# Patient Record
Sex: FEMALE | Employment: FULL TIME | URBAN - METROPOLITAN AREA
[De-identification: names, ages, dates, MRNs, and addresses within clinical notes are randomized per-mention and may not be internally consistent; named-entity substitution may affect disease eponyms.]

---

## 2018-08-13 ENCOUNTER — PATIENT OUTREACH (OUTPATIENT)
Dept: OTHER | Age: 73
End: 2018-08-13

## 2018-08-13 NOTE — PROGRESS NOTES
Transition Of Care Note    Patient discharged from [address] [place] [address] on 8/10  for Hip/pelvic pain after fall, resulting in 2 pelvic fractures. Medical History:     Past Medical History:   Diagnosis Date    Hepatitis     clinical- reaction to [address]    Ill-defined condition     fracture tibia/ fibula ( unsure which side)    Ill-defined condition     fracture left wrist    Ill-defined condition     fracture L5?  Ill-defined condition     HAYDE syndrome    Ill-defined condition 5/18/15    possible patella fracture versus patella rupture- left       Care Manager contacted the patient by telephone to perform post hospital discharge assessment. Verified  and zip code with patient as identifiers. Provided introduction to self, and explanation of the Nurse Care Manager role. Patient reports falling in the hotel lobby, resulting in a double pelvic fracture. Patient states that she has been informed that surgery will not be required, however will follow up with ortho on 8/15. Condition Focused Assessment:   Patient reports the following: Orthopedic Condition Focused Assessment    Skin- any open wounds or incisions? no  Description and location of wound- NA  Have you recently had any of the following? Any recent changes in activity yes,  Mobility or assistive device in place yes, currently using a walker to assist with ambulation. PT/OT/ST ordered no, saw patient in the hospital  Numbness or tingling no  Weakness no  Trouble with coordinating your movement, or change in gait no  New or worsening pain to calf, back of knee or groin no   Redness, swelling to leg or groin no  Fever no  Nausea no  Vomiting no  Chills or clammy skin no  Change in urine output no  Recent change in urinary or bowel continence no  Change in speech no  Difficulty swallowing no  Dizziness/lightheadedness no  Sleep disturbance no     Visual changes no  Medication changes?  yes    Medication:   New Medications at Discharge: Percocet, Tylenol, Metoprolol  Changed Medications at Discharge: None  Discontinued Medications at Discharge: None  Current Outpatient Prescriptions   Medication Sig    HYDROcodone-acetaminophen (NORCO) 7.5-325 mg per tablet Take 1 Tab by mouth every four (4) hours as needed. Max Daily Amount: 6 Tabs.  cetirizine (ZYRTEC) 10 mg tablet Take 10 mg by mouth daily.  oxyCODONE-acetaminophen (PERCOCET) 5-325 mg per tablet Take 1-2 Tabs by mouth every four (4) hours as needed for Pain. Max Daily Amount: 12 Tabs.  HYDROmorphone (DILAUDID) 2 mg tablet Take 1-2 Tabs by mouth every four (4) hours as needed for Pain. Max Daily Amount: 24 mg.  Cholecalciferol, Vitamin D3, (VITAMIN D3) 1,000 unit cap Take  by mouth daily. No current facility-administered medications for this visit. There are no discontinued medications. Performed medication reconciliation with patient, and patient verbalizes understanding of administration of home medications. There were no barriers to obtaining medications identified at this time. Inpatient RRAT score: Not available   Was this a readmission? no   Patient stated reason for the readmission: NA    Barriers/Support system:  patient and friend      Barriers/Challenges to Care: []  Decline in memory    []  Language barrier     []  Emotional                  [x]  Limited mobility  []  Lack of motivation     [] Vision, hearing or cognitive impairment []  Knowledge [] Financial Barriers []  Lack of support  [x]  Pain []  Other []  None    CM Identified  Problems   (contributing problems for risk for readmission)  1. Pain  2. Decreased mobility        Discharge Instructions :  Reviewed discharge instructions with patient. Patient verbalizes understanding of discharge instructions and follow-up care.      Advance Care Planning:   Patient was offered the opportunity to discuss advance care planning:  no     Does patient have an Advance Directive:  no If no, did you provide information on [facility]?  no     PCP/Specialist follow up: Patient scheduled to follow up with Bari Downing MD on 8/14. Patient will see Dr. Kade Massey (ortho) on 8/15. No future appointments. Reviewed red flags with patient, and patient verbalizes understanding. Patient given an opportunity to ask questions. No other clinical/social/functional needs noted. The patient agrees to contact the PCP office for questions related to their healthcare. The patient expressed thanks, offered no additional questions and ended the call.

## 2018-08-16 ENCOUNTER — HOSPITAL ENCOUNTER (OUTPATIENT)
Dept: MAMMOGRAPHY | Age: 73
Discharge: HOME | End: 2018-08-16
Attending: INTERNAL MEDICINE
Payer: COMMERCIAL

## 2018-08-16 DIAGNOSIS — Z12.31 VISIT FOR SCREENING MAMMOGRAM: ICD-10-CM

## 2018-08-16 DIAGNOSIS — M81.0 OSTEOPOROSIS: ICD-10-CM

## 2018-08-16 PROCEDURE — 77080 DXA BONE DENSITY AXIAL: CPT

## 2018-08-16 PROCEDURE — 77067 SCR MAMMO BI INCL CAD: CPT

## 2018-08-24 ENCOUNTER — PATIENT OUTREACH (OUTPATIENT)
Dept: OTHER | Age: 73
End: 2018-08-24

## 2018-08-24 NOTE — PROGRESS NOTES
Telephonic outreach attempt to follow up with patient following hospitalization from a fall, resulting in 2 fractures. Left a discreet VM, requesting a return call.

## 2018-08-30 ENCOUNTER — PATIENT OUTREACH (OUTPATIENT)
Dept: OTHER | Age: 73
End: 2018-08-30

## 2018-08-30 NOTE — PROGRESS NOTES
Telephonic outreach and return call from patient, currently enrolled in [facility]. Patient verified her  and zip code. Patient followed up with her Primary Care Physician, Dr. Regina Agudelo. Dexa Scan completed and patient prescribed Alendronate Sodium (Fosamax). Reports that her pain has improved, taking Tylenol for pain. Patient is participating in physical therapy and currently using a crutch, no longer needing the walker for ambulation. Patient has an appointment with her Cardiologist next week. This care manager will reach out to patient again in approximately 2 weeks to assess progress.

## 2018-10-04 ENCOUNTER — PATIENT OUTREACH (OUTPATIENT)
Dept: OTHER | Age: 73
End: 2018-10-04

## 2018-10-04 NOTE — PROGRESS NOTES
Resolving current episode [facility] (Transitions of care complete). No further ED/UC or hospital admissions within 30 days post discharge. Patient attended follow-up appointments as directed. No outreach from patient to [address] [place] [address]. Telephonic outreach attempt to patient. Left a discreet VM. Patient returned call, stated feeling well and progressing. Patient encouraged to reach out to this CM in the future should case management needs arise.

## 2019-10-11 ENCOUNTER — HOSPITAL ENCOUNTER (OUTPATIENT)
Dept: MAMMOGRAPHY | Age: 74
Discharge: HOME | End: 2019-10-11
Payer: COMMERCIAL

## 2019-10-11 DIAGNOSIS — Z12.31 VISIT FOR SCREENING MAMMOGRAM: ICD-10-CM

## 2019-10-11 PROCEDURE — 77067 SCR MAMMO BI INCL CAD: CPT

## 2020-11-05 ENCOUNTER — TRANSCRIBE ORDER (OUTPATIENT)
Dept: SCHEDULING | Age: 75
End: 2020-11-05

## 2020-11-05 DIAGNOSIS — Z12.31 VISIT FOR SCREENING MAMMOGRAM: Primary | ICD-10-CM

## 2021-07-17 ENCOUNTER — NURSE TRIAGE (OUTPATIENT)
Dept: OTHER | Facility: CLINIC | Age: 76
End: 2021-07-17

## 2021-07-17 NOTE — TELEPHONE ENCOUNTER
Brief description of triage: patient calls to state that she is having more frequent urination and burning. States that it started Thursday    Triage indicates for patient to see PCP within 24 hours, if not open or no available appointments, patient encouraged to go to the nearest urgent care center    Care advice provided, patient verbalizes understanding; denies any other questions or concerns; instructed to call back for any new or worsening symptoms. Attention Provider: Thank you for allowing me to participate in the care of your patient. The patient was connected to triage in response to symptoms provided. Please do not respond through this encounter as the response is not directed to a shared pool. Reason for Disposition   Age > 50 years    Answer Assessment - Initial Assessment Questions  1. SEVERITY: \"How bad is the pain? \"  (e.g., Scale 1-10; mild, moderate, or severe)    - MILD (1-3): complains slightly about urination hurting    - MODERATE (4-7): interferes with normal activities      - SEVERE (8-10): excruciating, unwilling or unable to urinate because of the pain       3    2. FREQUENCY: \"How many times have you had painful urination today? \"       3    3. PATTERN: \"Is pain present every time you urinate or just sometimes? \"       Every time    4. ONSET: \"When did the painful urination start? \"       Thursday    5. FEVER: \"Do you have a fever? \" If so, ask: \"What is your temperature, how was it measured, and when did it start? \"      No    6. PAST UTI: \"Have you had a urine infection before? \" If so, ask: \"When was the last time? \" and \"What happened that time? \"       Yes,  States that it happened a long time ago, and that she was given Cipro    7. CAUSE: \"What do you think is causing the painful urination? \"  (e.g., UTI, scratch, Herpes sore)      UTI    8. OTHER SYMPTOMS: \"Do you have any other symptoms? \" (e.g., flank pain, vaginal discharge, genital sores, urgency, blood in urine)      Urine is cloudy, more frequent urination    9. PREGNANCY: \"Is there any chance you are pregnant? \" \"When was your last menstrual period? \"      n/a    Protocols used: URINATION PAIN - FEMALE-ADULT-AH